# Patient Record
Sex: MALE | Race: WHITE | NOT HISPANIC OR LATINO | Employment: UNEMPLOYED | URBAN - METROPOLITAN AREA
[De-identification: names, ages, dates, MRNs, and addresses within clinical notes are randomized per-mention and may not be internally consistent; named-entity substitution may affect disease eponyms.]

---

## 2017-05-17 ENCOUNTER — LAB (OUTPATIENT)
Dept: LAB | Facility: HOSPITAL | Age: 37
End: 2017-05-17

## 2017-05-17 ENCOUNTER — TRANSCRIBE ORDERS (OUTPATIENT)
Dept: LAB | Facility: HOSPITAL | Age: 37
End: 2017-05-17

## 2017-05-17 DIAGNOSIS — N46.9 MALE INFERTILITY, UNSPECIFIED: Primary | ICD-10-CM

## 2017-05-17 DIAGNOSIS — N46.9 MALE INFERTILITY, UNSPECIFIED: ICD-10-CM

## 2017-05-17 LAB
COLOR SMN: ABNORMAL
LIQUEFACTION TIME SMN: YES MIN
Lab: ABNORMAL
PH SMN: 8 [PH] (ref 7.2–10)
SPECIMEN VOL SMN: <1 ML (ref 2–100)
SPERM SMN: ABNORMAL % NORMAL
VISC SMN: 2 CM

## 2017-05-17 PROCEDURE — 89320 SEMEN ANAL VOL/COUNT/MOT: CPT | Performed by: FAMILY MEDICINE

## 2020-07-11 ENCOUNTER — APPOINTMENT (OUTPATIENT)
Dept: GENERAL RADIOLOGY | Facility: HOSPITAL | Age: 40
End: 2020-07-11

## 2020-07-11 ENCOUNTER — HOSPITAL ENCOUNTER (EMERGENCY)
Facility: HOSPITAL | Age: 40
Discharge: HOME | End: 2020-07-11
Attending: EMERGENCY MEDICINE | Admitting: EMERGENCY MEDICINE

## 2020-07-11 VITALS
RESPIRATION RATE: 16 BRPM | HEIGHT: 72 IN | WEIGHT: 206.4 LBS | TEMPERATURE: 98.7 F | OXYGEN SATURATION: 96 % | DIASTOLIC BLOOD PRESSURE: 113 MMHG | BODY MASS INDEX: 27.96 KG/M2 | HEART RATE: 84 BPM | SYSTOLIC BLOOD PRESSURE: 161 MMHG

## 2020-07-11 DIAGNOSIS — S80.01XA CONTUSION OF RIGHT KNEE, INITIAL ENCOUNTER: ICD-10-CM
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
## 2020-07-11 DIAGNOSIS — S20.211A CONTUSION OF RIGHT CHEST WALL, INITIAL ENCOUNTER: ICD-10-CM

## 2020-07-11 PROCEDURE — 71046 X-RAY EXAM CHEST 2 VIEWS: CPT

## 2020-07-11 PROCEDURE — 99282 EMERGENCY DEPT VISIT SF MDM: CPT

## 2020-07-11 PROCEDURE — 73562 X-RAY EXAM OF KNEE 3: CPT
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
## 2021-04-20 ENCOUNTER — IMMUNIZATION (OUTPATIENT)
Dept: VACCINE CLINIC | Facility: HOSPITAL | Age: 41
End: 2021-04-20

## 2021-04-20 PROCEDURE — 91300 HC SARSCOV02 VAC 30MCG/0.3ML IM: CPT | Performed by: INTERNAL MEDICINE

## 2021-04-20 PROCEDURE — 0001A: CPT | Performed by: INTERNAL MEDICINE

## 2021-05-13 ENCOUNTER — IMMUNIZATION (OUTPATIENT)
Dept: VACCINE CLINIC | Facility: HOSPITAL | Age: 41
End: 2021-05-13

## 2021-05-13 PROCEDURE — 91300 HC SARSCOV02 VAC 30MCG/0.3ML IM: CPT | Performed by: INTERNAL MEDICINE

## 2021-05-13 PROCEDURE — 0002A: CPT | Performed by: INTERNAL MEDICINE
